# Patient Record
Sex: FEMALE | Race: WHITE | NOT HISPANIC OR LATINO | Employment: OTHER | URBAN - METROPOLITAN AREA
[De-identification: names, ages, dates, MRNs, and addresses within clinical notes are randomized per-mention and may not be internally consistent; named-entity substitution may affect disease eponyms.]

---

## 2017-03-03 ENCOUNTER — CLINIC PROCEDURE ONLY (OUTPATIENT)
Dept: URBAN - METROPOLITAN AREA CLINIC 33 | Facility: CLINIC | Age: 82
End: 2017-03-03

## 2017-03-03 DIAGNOSIS — H35.3231: ICD-10-CM

## 2017-03-03 PROCEDURE — 67028 INJECTION EYE DRUG: CPT

## 2017-03-03 PROCEDURE — J2778* LUCENTIS 0.1MG

## 2017-03-03 ASSESSMENT — VISUAL ACUITY
OD_SC: 20/400-1
OS_SC: 20/80-1

## 2017-03-03 ASSESSMENT — TONOMETRY
OD_IOP_MMHG: 13
OS_IOP_MMHG: 13

## 2017-05-05 ENCOUNTER — CLINIC PROCEDURE ONLY (OUTPATIENT)
Dept: URBAN - METROPOLITAN AREA CLINIC 33 | Facility: CLINIC | Age: 82
End: 2017-05-05

## 2017-05-05 DIAGNOSIS — H35.3231: ICD-10-CM

## 2017-05-05 PROCEDURE — 67028 INJECTION EYE DRUG: CPT

## 2017-05-05 ASSESSMENT — TONOMETRY
OD_IOP_MMHG: 13
OS_IOP_MMHG: 15

## 2017-05-05 ASSESSMENT — VISUAL ACUITY
OS_SC: 20/200
OD_SC: 20/400-1

## 2017-07-14 ENCOUNTER — FOLLOW UP AND POST INJECTION EVALUATION (OUTPATIENT)
Dept: URBAN - METROPOLITAN AREA CLINIC 33 | Facility: CLINIC | Age: 82
End: 2017-07-14

## 2017-07-14 VITALS
HEIGHT: 64 IN | HEART RATE: 76 BPM | DIASTOLIC BLOOD PRESSURE: 80 MMHG | SYSTOLIC BLOOD PRESSURE: 138 MMHG | WEIGHT: 205 LBS | BODY MASS INDEX: 35 KG/M2

## 2017-07-14 DIAGNOSIS — H35.3231: ICD-10-CM

## 2017-07-14 DIAGNOSIS — H01.003: ICD-10-CM

## 2017-07-14 DIAGNOSIS — H35.372: ICD-10-CM

## 2017-07-14 DIAGNOSIS — H43.813: ICD-10-CM

## 2017-07-14 DIAGNOSIS — H01.006: ICD-10-CM

## 2017-07-14 PROCEDURE — 92235 FLUORESCEIN ANGRPH MLTIFRAME: CPT

## 2017-07-14 PROCEDURE — 92014 COMPRE OPH EXAM EST PT 1/>: CPT

## 2017-07-14 PROCEDURE — 92250 FUNDUS PHOTOGRAPHY W/I&R: CPT

## 2017-07-14 PROCEDURE — 92134 CPTRZ OPH DX IMG PST SGM RTA: CPT

## 2017-07-14 ASSESSMENT — TONOMETRY
OD_IOP_MMHG: 15
OS_IOP_MMHG: 15

## 2017-07-14 ASSESSMENT — VISUAL ACUITY
OD_SC: 20/200
OD_PH: 20/200+2
OS_SC: 20/60-2
OS_PH: 20/50-

## 2017-07-21 ENCOUNTER — CLINIC PROCEDURE ONLY (OUTPATIENT)
Dept: URBAN - METROPOLITAN AREA CLINIC 33 | Facility: CLINIC | Age: 82
End: 2017-07-21

## 2017-07-21 DIAGNOSIS — H35.3231: ICD-10-CM

## 2017-07-21 PROCEDURE — J2778* LUCENTIS 0.1MG

## 2017-07-21 PROCEDURE — 67028 INJECTION EYE DRUG: CPT

## 2017-07-21 ASSESSMENT — TONOMETRY
OS_IOP_MMHG: 15
OD_IOP_MMHG: 11

## 2017-07-21 ASSESSMENT — VISUAL ACUITY
OD_SC: 20/200-1
OS_SC: 20/60-1

## 2017-09-22 ENCOUNTER — CLINIC PROCEDURE ONLY (OUTPATIENT)
Dept: URBAN - METROPOLITAN AREA CLINIC 33 | Facility: CLINIC | Age: 82
End: 2017-09-22

## 2017-09-22 DIAGNOSIS — H35.3231: ICD-10-CM

## 2017-09-22 PROCEDURE — 67028 INJECTION EYE DRUG: CPT

## 2017-09-22 PROCEDURE — J2778* LUCENTIS 0.1MG

## 2017-09-22 ASSESSMENT — VISUAL ACUITY
OS_CC: 20/60-
OD_CC: 20/200

## 2017-09-22 ASSESSMENT — TONOMETRY
OS_IOP_MMHG: 15
OD_IOP_MMHG: 14

## 2017-12-01 ENCOUNTER — FOLLOW UP (OUTPATIENT)
Dept: URBAN - METROPOLITAN AREA CLINIC 33 | Facility: CLINIC | Age: 82
End: 2017-12-01

## 2017-12-01 VITALS — HEART RATE: 76 BPM | HEIGHT: 55 IN | SYSTOLIC BLOOD PRESSURE: 131 MMHG | DIASTOLIC BLOOD PRESSURE: 90 MMHG

## 2017-12-01 DIAGNOSIS — H43.813: ICD-10-CM

## 2017-12-01 DIAGNOSIS — H35.372: ICD-10-CM

## 2017-12-01 DIAGNOSIS — H35.3231: ICD-10-CM

## 2017-12-01 PROCEDURE — 92134 CPTRZ OPH DX IMG PST SGM RTA: CPT

## 2017-12-01 PROCEDURE — 92250 FUNDUS PHOTOGRAPHY W/I&R: CPT

## 2017-12-01 PROCEDURE — 92014 COMPRE OPH EXAM EST PT 1/>: CPT

## 2017-12-01 ASSESSMENT — VISUAL ACUITY
OD_PH: 20/100-1
OD_CC: 20/200-3
OS_CC: 20/50+2

## 2017-12-01 ASSESSMENT — TONOMETRY
OD_IOP_MMHG: 11
OS_IOP_MMHG: 13

## 2017-12-08 ENCOUNTER — CLINIC PROCEDURE ONLY (OUTPATIENT)
Dept: URBAN - METROPOLITAN AREA CLINIC 33 | Facility: CLINIC | Age: 82
End: 2017-12-08

## 2017-12-08 DIAGNOSIS — H35.3231: ICD-10-CM

## 2017-12-08 PROCEDURE — J2778* LUCENTIS 0.1MG

## 2017-12-08 PROCEDURE — 67028 INJECTION EYE DRUG: CPT

## 2017-12-08 ASSESSMENT — VISUAL ACUITY
OS_SC: 20/100
OD_SC: 20/400
OS_PH: 20/50
OD_PH: 20/200+2

## 2017-12-08 ASSESSMENT — TONOMETRY
OS_IOP_MMHG: 13
OD_IOP_MMHG: 11

## 2018-02-09 ENCOUNTER — CLINIC PROCEDURE ONLY (OUTPATIENT)
Dept: URBAN - METROPOLITAN AREA CLINIC 33 | Facility: CLINIC | Age: 83
End: 2018-02-09

## 2018-02-09 DIAGNOSIS — H35.3231: ICD-10-CM

## 2018-02-09 PROCEDURE — 67028 INJECTION EYE DRUG: CPT

## 2018-02-09 PROCEDURE — J2778* LUCENTIS 0.1MG

## 2018-02-09 ASSESSMENT — TONOMETRY
OS_IOP_MMHG: 12
OD_IOP_MMHG: 12

## 2018-02-09 ASSESSMENT — VISUAL ACUITY
OD_CC: 20/200
OS_CC: 20/50-

## 2018-04-13 ENCOUNTER — CLINIC PROCEDURE ONLY (OUTPATIENT)
Dept: URBAN - METROPOLITAN AREA CLINIC 33 | Facility: CLINIC | Age: 83
End: 2018-04-13

## 2018-04-13 DIAGNOSIS — H35.3231: ICD-10-CM

## 2018-04-13 PROCEDURE — J2778* LUCENTIS 0.1MG

## 2018-04-13 PROCEDURE — 67028 INJECTION EYE DRUG: CPT

## 2018-04-13 ASSESSMENT — TONOMETRY
OS_IOP_MMHG: 12
OD_IOP_MMHG: 11

## 2018-04-13 ASSESSMENT — VISUAL ACUITY
OD_CC: 20/200-2
OS_CC: 20/60+2

## 2018-06-22 ENCOUNTER — FOLLOW UP AND POST INJECTION EVALUATION (OUTPATIENT)
Dept: URBAN - METROPOLITAN AREA CLINIC 33 | Facility: CLINIC | Age: 83
End: 2018-06-22

## 2018-06-22 VITALS — WEIGHT: 190 LBS | BODY MASS INDEX: 32.44 KG/M2 | HEIGHT: 64 IN

## 2018-06-22 DIAGNOSIS — H02.833: ICD-10-CM

## 2018-06-22 DIAGNOSIS — H43.813: ICD-10-CM

## 2018-06-22 DIAGNOSIS — H01.003: ICD-10-CM

## 2018-06-22 DIAGNOSIS — H35.3231: ICD-10-CM

## 2018-06-22 DIAGNOSIS — H04.123: ICD-10-CM

## 2018-06-22 DIAGNOSIS — H01.006: ICD-10-CM

## 2018-06-22 DIAGNOSIS — H02.836: ICD-10-CM

## 2018-06-22 DIAGNOSIS — H35.372: ICD-10-CM

## 2018-06-22 PROCEDURE — 67028 INJECTION EYE DRUG: CPT

## 2018-06-22 PROCEDURE — 92250 FUNDUS PHOTOGRAPHY W/I&R: CPT

## 2018-06-22 PROCEDURE — 92134 CPTRZ OPH DX IMG PST SGM RTA: CPT

## 2018-06-22 PROCEDURE — 92014 COMPRE OPH EXAM EST PT 1/>: CPT

## 2018-06-22 ASSESSMENT — VISUAL ACUITY
OD_PH: 20/400+1
OD_CC: 20/400+1
OS_CC: 20/50-1

## 2018-06-22 ASSESSMENT — TONOMETRY
OD_IOP_MMHG: 13
OS_IOP_MMHG: 13

## 2018-08-24 ENCOUNTER — CLINICAL PROCEDURE AND DIAGNOSTIC TESTING ONLY (OUTPATIENT)
Dept: URBAN - METROPOLITAN AREA CLINIC 33 | Facility: CLINIC | Age: 83
End: 2018-08-24

## 2018-08-24 DIAGNOSIS — H35.3231: ICD-10-CM

## 2018-08-24 PROCEDURE — 92134 CPTRZ OPH DX IMG PST SGM RTA: CPT

## 2018-08-24 PROCEDURE — 67028 INJECTION EYE DRUG: CPT

## 2018-08-24 ASSESSMENT — VISUAL ACUITY
OS_CC: 20/50-
OD_CC: 20/400

## 2018-08-24 ASSESSMENT — TONOMETRY
OS_IOP_MMHG: 13
OD_IOP_MMHG: 12

## 2018-11-02 ENCOUNTER — CLINICAL PROCEDURE AND DIAGNOSTIC TESTING ONLY (OUTPATIENT)
Dept: URBAN - METROPOLITAN AREA CLINIC 33 | Facility: CLINIC | Age: 83
End: 2018-11-02

## 2018-11-02 DIAGNOSIS — H35.3231: ICD-10-CM

## 2018-11-02 PROCEDURE — 67028 INJECTION EYE DRUG: CPT

## 2018-11-02 PROCEDURE — 92134 CPTRZ OPH DX IMG PST SGM RTA: CPT

## 2018-11-02 ASSESSMENT — TONOMETRY
OD_IOP_MMHG: 14
OS_IOP_MMHG: 17

## 2018-11-02 ASSESSMENT — VISUAL ACUITY
OD_CC: 20/400-1
OS_CC: 20/60-2
OS_PH: 20/60+1

## 2019-01-04 ENCOUNTER — FOLLOW UP AND POST INJECTION EVALUATION (OUTPATIENT)
Dept: URBAN - METROPOLITAN AREA CLINIC 33 | Facility: CLINIC | Age: 84
End: 2019-01-04

## 2019-01-04 VITALS — WEIGHT: 190 LBS | BODY MASS INDEX: 32.44 KG/M2 | HEIGHT: 64 IN

## 2019-01-04 DIAGNOSIS — H01.006: ICD-10-CM

## 2019-01-04 DIAGNOSIS — H02.833: ICD-10-CM

## 2019-01-04 DIAGNOSIS — H35.372: ICD-10-CM

## 2019-01-04 DIAGNOSIS — H04.123: ICD-10-CM

## 2019-01-04 DIAGNOSIS — H01.003: ICD-10-CM

## 2019-01-04 DIAGNOSIS — H43.813: ICD-10-CM

## 2019-01-04 DIAGNOSIS — H02.836: ICD-10-CM

## 2019-01-04 DIAGNOSIS — H35.3231: ICD-10-CM

## 2019-01-04 PROCEDURE — 92014 COMPRE OPH EXAM EST PT 1/>: CPT

## 2019-01-04 PROCEDURE — 92134 CPTRZ OPH DX IMG PST SGM RTA: CPT

## 2019-01-04 PROCEDURE — 92250 FUNDUS PHOTOGRAPHY W/I&R: CPT

## 2019-01-04 ASSESSMENT — VISUAL ACUITY
OD_CC: 20/200+1
OS_CC: 20/50

## 2019-01-04 ASSESSMENT — TONOMETRY
OS_IOP_MMHG: 16
OD_IOP_MMHG: 14

## 2019-01-11 ENCOUNTER — CLINIC PROCEDURE ONLY (OUTPATIENT)
Dept: URBAN - METROPOLITAN AREA CLINIC 33 | Facility: CLINIC | Age: 84
End: 2019-01-11

## 2019-01-11 DIAGNOSIS — H35.3231: ICD-10-CM

## 2019-01-11 PROCEDURE — 67028 INJECTION EYE DRUG: CPT

## 2019-01-11 ASSESSMENT — TONOMETRY
OD_IOP_MMHG: 13
OS_IOP_MMHG: 12

## 2019-01-11 ASSESSMENT — VISUAL ACUITY
OS_CC: 20/50-1
OD_CC: 20/200-2

## 2019-03-15 ENCOUNTER — CLINICAL PROCEDURE AND DIAGNOSTIC TESTING ONLY (OUTPATIENT)
Dept: URBAN - METROPOLITAN AREA CLINIC 33 | Facility: CLINIC | Age: 84
End: 2019-03-15

## 2019-03-15 DIAGNOSIS — H35.3231: ICD-10-CM

## 2019-03-15 PROCEDURE — 67028 INJECTION EYE DRUG: CPT

## 2019-03-15 PROCEDURE — 92134 CPTRZ OPH DX IMG PST SGM RTA: CPT

## 2019-03-15 PROCEDURE — J2778* LUCENTIS 0.1MG

## 2019-03-15 ASSESSMENT — TONOMETRY
OD_IOP_MMHG: 14
OS_IOP_MMHG: 16

## 2019-03-15 ASSESSMENT — VISUAL ACUITY
OS_SC: 20/50
OD_SC: 20/200

## 2019-05-17 ENCOUNTER — CLINICAL PROCEDURE AND DIAGNOSTIC TESTING ONLY (OUTPATIENT)
Dept: URBAN - METROPOLITAN AREA CLINIC 33 | Facility: CLINIC | Age: 84
End: 2019-05-17

## 2019-05-17 DIAGNOSIS — H35.3231: ICD-10-CM

## 2019-05-17 PROCEDURE — 67028 INJECTION EYE DRUG: CPT

## 2019-05-17 PROCEDURE — J2778* LUCENTIS 0.1MG

## 2019-05-17 PROCEDURE — 92134 CPTRZ OPH DX IMG PST SGM RTA: CPT

## 2019-05-17 ASSESSMENT — VISUAL ACUITY
OS_CC: 20/60+1
OD_CC: 20/200-2

## 2019-05-17 ASSESSMENT — TONOMETRY
OS_IOP_MMHG: 14
OD_IOP_MMHG: 16

## 2019-07-12 ENCOUNTER — FOLLOW UP (OUTPATIENT)
Dept: URBAN - METROPOLITAN AREA CLINIC 33 | Facility: CLINIC | Age: 84
End: 2019-07-12

## 2019-07-12 DIAGNOSIS — H04.123: ICD-10-CM

## 2019-07-12 DIAGNOSIS — H35.372: ICD-10-CM

## 2019-07-12 DIAGNOSIS — H02.836: ICD-10-CM

## 2019-07-12 DIAGNOSIS — H35.3231: ICD-10-CM

## 2019-07-12 DIAGNOSIS — H43.813: ICD-10-CM

## 2019-07-12 DIAGNOSIS — H01.006: ICD-10-CM

## 2019-07-12 DIAGNOSIS — H01.003: ICD-10-CM

## 2019-07-12 DIAGNOSIS — H02.833: ICD-10-CM

## 2019-07-12 PROCEDURE — 92250 FUNDUS PHOTOGRAPHY W/I&R: CPT

## 2019-07-12 PROCEDURE — 92134 CPTRZ OPH DX IMG PST SGM RTA: CPT

## 2019-07-12 PROCEDURE — 92014 COMPRE OPH EXAM EST PT 1/>: CPT

## 2019-07-12 ASSESSMENT — TONOMETRY
OS_IOP_MMHG: 10
OD_IOP_MMHG: 13

## 2019-07-12 ASSESSMENT — VISUAL ACUITY
OS_CC: 20/50+2
OD_CC: 20/200

## 2019-07-19 ENCOUNTER — CLINIC PROCEDURE ONLY (OUTPATIENT)
Dept: URBAN - METROPOLITAN AREA CLINIC 33 | Facility: CLINIC | Age: 84
End: 2019-07-19

## 2019-07-19 DIAGNOSIS — H35.3231: ICD-10-CM

## 2019-07-19 PROCEDURE — J2778* LUCENTIS 0.1MG

## 2019-07-19 PROCEDURE — 67028 INJECTION EYE DRUG: CPT

## 2019-07-19 ASSESSMENT — TONOMETRY
OS_IOP_MMHG: 19
OD_IOP_MMHG: 14

## 2019-07-19 ASSESSMENT — VISUAL ACUITY
OS_CC: 20/60-2
OD_CC: 20/200-2

## 2019-09-13 ENCOUNTER — CLINICAL PROCEDURE AND DIAGNOSTIC TESTING ONLY (OUTPATIENT)
Dept: URBAN - METROPOLITAN AREA CLINIC 33 | Facility: CLINIC | Age: 84
End: 2019-09-13

## 2019-09-13 DIAGNOSIS — H35.3231: ICD-10-CM

## 2019-09-13 PROCEDURE — 67028 INJECTION EYE DRUG: CPT

## 2019-09-13 PROCEDURE — J2778* LUCENTIS 0.1MG

## 2019-09-13 PROCEDURE — 92134 CPTRZ OPH DX IMG PST SGM RTA: CPT

## 2019-09-13 ASSESSMENT — VISUAL ACUITY
OD_CC: 20/400
OS_CC: 20/40-

## 2019-09-13 ASSESSMENT — TONOMETRY
OS_IOP_MMHG: 14
OD_IOP_MMHG: 14

## 2019-11-08 ENCOUNTER — CLINICAL PROCEDURE AND DIAGNOSTIC TESTING ONLY (OUTPATIENT)
Dept: URBAN - METROPOLITAN AREA CLINIC 33 | Facility: CLINIC | Age: 84
End: 2019-11-08

## 2019-11-08 DIAGNOSIS — H35.3231: ICD-10-CM

## 2019-11-08 PROCEDURE — 92134 CPTRZ OPH DX IMG PST SGM RTA: CPT

## 2019-11-08 PROCEDURE — 67028 INJECTION EYE DRUG: CPT

## 2019-11-08 PROCEDURE — J2778* LUCENTIS 0.1MG

## 2019-11-08 ASSESSMENT — TONOMETRY
OS_IOP_MMHG: 14
OD_IOP_MMHG: 13

## 2019-11-08 ASSESSMENT — VISUAL ACUITY
OD_CC: 20/200+2
OS_CC: 20/40

## 2020-01-03 ENCOUNTER — FOLLOW UP AND POST INJECTION EVALUATION (OUTPATIENT)
Dept: URBAN - METROPOLITAN AREA CLINIC 33 | Facility: CLINIC | Age: 85
End: 2020-01-03

## 2020-01-03 VITALS — BODY MASS INDEX: 34.15 KG/M2 | WEIGHT: 200 LBS | HEIGHT: 64 IN

## 2020-01-03 DIAGNOSIS — H35.372: ICD-10-CM

## 2020-01-03 DIAGNOSIS — H02.836: ICD-10-CM

## 2020-01-03 DIAGNOSIS — H35.3231: ICD-10-CM

## 2020-01-03 DIAGNOSIS — H01.006: ICD-10-CM

## 2020-01-03 DIAGNOSIS — H02.833: ICD-10-CM

## 2020-01-03 DIAGNOSIS — H04.123: ICD-10-CM

## 2020-01-03 DIAGNOSIS — H43.813: ICD-10-CM

## 2020-01-03 DIAGNOSIS — H01.003: ICD-10-CM

## 2020-01-03 PROCEDURE — 92250 FUNDUS PHOTOGRAPHY W/I&R: CPT

## 2020-01-03 PROCEDURE — 92014 COMPRE OPH EXAM EST PT 1/>: CPT

## 2020-01-03 PROCEDURE — 92134 CPTRZ OPH DX IMG PST SGM RTA: CPT

## 2020-01-03 ASSESSMENT — TONOMETRY
OD_IOP_MMHG: 14
OS_IOP_MMHG: 17

## 2020-01-03 ASSESSMENT — VISUAL ACUITY
OS_CC: 20/40
OD_CC: 20/400+1

## 2020-01-10 ENCOUNTER — CLINIC PROCEDURE ONLY (OUTPATIENT)
Dept: URBAN - METROPOLITAN AREA CLINIC 33 | Facility: CLINIC | Age: 85
End: 2020-01-10

## 2020-01-10 DIAGNOSIS — H35.3231: ICD-10-CM

## 2020-01-10 PROCEDURE — J2778* LUCENTIS 0.1MG

## 2020-01-10 PROCEDURE — 67028 INJECTION EYE DRUG: CPT

## 2020-01-10 ASSESSMENT — TONOMETRY
OS_IOP_MMHG: 14
OD_IOP_MMHG: 15

## 2020-01-10 ASSESSMENT — VISUAL ACUITY
OS_CC: 20/40-2
OD_CC: 20/200+2

## 2020-01-17 ENCOUNTER — APPOINTMENT (RX ONLY)
Dept: URBAN - METROPOLITAN AREA CLINIC 116 | Facility: CLINIC | Age: 85
Setting detail: DERMATOLOGY
End: 2020-01-17

## 2020-01-17 DIAGNOSIS — D18.0 HEMANGIOMA: ICD-10-CM

## 2020-01-17 DIAGNOSIS — L81.4 OTHER MELANIN HYPERPIGMENTATION: ICD-10-CM

## 2020-01-17 DIAGNOSIS — Z85.828 PERSONAL HISTORY OF OTHER MALIGNANT NEOPLASM OF SKIN: ICD-10-CM

## 2020-01-17 DIAGNOSIS — L82.1 OTHER SEBORRHEIC KERATOSIS: ICD-10-CM

## 2020-01-17 DIAGNOSIS — L57.0 ACTINIC KERATOSIS: ICD-10-CM

## 2020-01-17 PROBLEM — H91.90 UNSPECIFIED HEARING LOSS, UNSPECIFIED EAR: Status: ACTIVE | Noted: 2020-01-17

## 2020-01-17 PROBLEM — F41.9 ANXIETY DISORDER, UNSPECIFIED: Status: ACTIVE | Noted: 2020-01-17

## 2020-01-17 PROBLEM — D18.01 HEMANGIOMA OF SKIN AND SUBCUTANEOUS TISSUE: Status: ACTIVE | Noted: 2020-01-17

## 2020-01-17 PROBLEM — F32.9 MAJOR DEPRESSIVE DISORDER, SINGLE EPISODE, UNSPECIFIED: Status: ACTIVE | Noted: 2020-01-17

## 2020-01-17 PROCEDURE — ? COUNSELING

## 2020-01-17 PROCEDURE — 17000 DESTRUCT PREMALG LESION: CPT

## 2020-01-17 PROCEDURE — ? LIQUID NITROGEN

## 2020-01-17 PROCEDURE — ? OBSERVATION

## 2020-01-17 PROCEDURE — ? ADDITIONAL NOTES

## 2020-01-17 PROCEDURE — 17003 DESTRUCT PREMALG LES 2-14: CPT

## 2020-01-17 PROCEDURE — 99203 OFFICE O/P NEW LOW 30 MIN: CPT | Mod: 25

## 2020-01-17 ASSESSMENT — LOCATION SIMPLE DESCRIPTION DERM
LOCATION SIMPLE: UPPER BACK
LOCATION SIMPLE: RIGHT ANTERIOR NECK
LOCATION SIMPLE: LEFT NOSE
LOCATION SIMPLE: LEFT ANTERIOR NECK
LOCATION SIMPLE: NOSE
LOCATION SIMPLE: LEFT FOREHEAD
LOCATION SIMPLE: RIGHT UPPER BACK
LOCATION SIMPLE: RIGHT THIGH
LOCATION SIMPLE: LEFT LIP
LOCATION SIMPLE: LEFT CHEEK

## 2020-01-17 ASSESSMENT — LOCATION DETAILED DESCRIPTION DERM
LOCATION DETAILED: NASAL DORSUM
LOCATION DETAILED: LEFT UPPER CUTANEOUS LIP
LOCATION DETAILED: LEFT FOREHEAD
LOCATION DETAILED: LEFT SUPERIOR CENTRAL BUCCAL CHEEK
LOCATION DETAILED: RIGHT MEDIAL UPPER BACK
LOCATION DETAILED: LEFT NASAL ALA
LOCATION DETAILED: SUPERIOR THORACIC SPINE
LOCATION DETAILED: LEFT SUPERIOR ANTERIOR NECK
LOCATION DETAILED: RIGHT SUPERIOR ANTERIOR NECK
LOCATION DETAILED: RIGHT ANTERIOR DISTAL THIGH

## 2020-01-17 ASSESSMENT — LOCATION ZONE DERM
LOCATION ZONE: NECK
LOCATION ZONE: FACE
LOCATION ZONE: TRUNK
LOCATION ZONE: NOSE
LOCATION ZONE: LIP
LOCATION ZONE: LEG

## 2020-01-17 NOTE — PROCEDURE: LIQUID NITROGEN
Communicable/Infectious
Duration Of Freeze Thaw-Cycle (Seconds): 3
Render Note In Bullet Format When Appropriate: No
Post-Care Instructions: I reviewed with the patient in detail post-care instructions. Patient is to wear sunprotection, and avoid picking at any of the treated lesions. Pt may apply Vaseline to crusted or scabbing areas.
Number Of Freeze-Thaw Cycles: 3 freeze-thaw cycles
Render Post-Care Instructions In Note?: yes
Detail Level: Detailed
Consent: The patient's consent was obtained including but not limited to risks of crusting, scabbing, blistering, scarring, darker or lighter pigmentary change, recurrence, incomplete removal and infection.

## 2020-03-06 ENCOUNTER — CLINICAL PROCEDURE AND DIAGNOSTIC TESTING ONLY (OUTPATIENT)
Dept: URBAN - METROPOLITAN AREA CLINIC 33 | Facility: CLINIC | Age: 85
End: 2020-03-06

## 2020-03-06 DIAGNOSIS — H35.3231: ICD-10-CM

## 2020-03-06 PROCEDURE — 92250 FUNDUS PHOTOGRAPHY W/I&R: CPT

## 2020-03-06 PROCEDURE — 92134 CPTRZ OPH DX IMG PST SGM RTA: CPT

## 2020-03-06 PROCEDURE — 67028 INJECTION EYE DRUG: CPT

## 2020-03-06 PROCEDURE — J2778* LUCENTIS 0.1MG

## 2020-03-06 ASSESSMENT — VISUAL ACUITY
OS_CC: 20/60-
OD_CC: 20/200

## 2020-03-06 ASSESSMENT — TONOMETRY
OD_IOP_MMHG: 12
OS_IOP_MMHG: 12

## 2020-05-01 ENCOUNTER — FOLLOW UP AND POST INJECTION EVALUATION (OUTPATIENT)
Dept: URBAN - METROPOLITAN AREA CLINIC 33 | Facility: CLINIC | Age: 85
End: 2020-05-01

## 2020-05-01 DIAGNOSIS — H35.3231: ICD-10-CM

## 2020-05-01 DIAGNOSIS — H35.372: ICD-10-CM

## 2020-05-01 PROCEDURE — J2778* LUCENTIS 0.1MG

## 2020-05-01 PROCEDURE — 92250 FUNDUS PHOTOGRAPHY W/I&R: CPT

## 2020-05-01 PROCEDURE — 92134 CPTRZ OPH DX IMG PST SGM RTA: CPT

## 2020-05-01 PROCEDURE — 67028 INJECTION EYE DRUG: CPT

## 2020-05-01 ASSESSMENT — VISUAL ACUITY
OS_CC: 20/50-1
OD_CC: 20/200-2

## 2020-05-01 ASSESSMENT — TONOMETRY
OS_IOP_MMHG: 15
OD_IOP_MMHG: 13

## 2020-06-12 ENCOUNTER — CLINICAL PROCEDURE AND DIAGNOSTIC TESTING ONLY (OUTPATIENT)
Dept: URBAN - METROPOLITAN AREA CLINIC 33 | Facility: CLINIC | Age: 85
End: 2020-06-12

## 2020-06-12 DIAGNOSIS — H35.3231: ICD-10-CM

## 2020-06-12 DIAGNOSIS — H35.372: ICD-10-CM

## 2020-06-12 PROCEDURE — 67028 INJECTION EYE DRUG: CPT

## 2020-06-12 PROCEDURE — 92134 CPTRZ OPH DX IMG PST SGM RTA: CPT

## 2020-06-12 PROCEDURE — J2778* LUCENTIS 0.1MG

## 2020-06-12 PROCEDURE — 92250 FUNDUS PHOTOGRAPHY W/I&R: CPT

## 2020-06-12 ASSESSMENT — TONOMETRY
OD_IOP_MMHG: 13
OS_IOP_MMHG: 11

## 2020-06-12 ASSESSMENT — VISUAL ACUITY
OS_CC: 20/60-1
OD_CC: 20/200-2

## 2020-08-07 ENCOUNTER — FOLLOW UP AND POST INJECTION EVALUATION (OUTPATIENT)
Dept: URBAN - METROPOLITAN AREA CLINIC 33 | Facility: CLINIC | Age: 85
End: 2020-08-07

## 2020-08-07 DIAGNOSIS — H01.003: ICD-10-CM

## 2020-08-07 DIAGNOSIS — H43.813: ICD-10-CM

## 2020-08-07 DIAGNOSIS — H35.372: ICD-10-CM

## 2020-08-07 DIAGNOSIS — H02.836: ICD-10-CM

## 2020-08-07 DIAGNOSIS — H01.006: ICD-10-CM

## 2020-08-07 DIAGNOSIS — H35.3231: ICD-10-CM

## 2020-08-07 DIAGNOSIS — H02.833: ICD-10-CM

## 2020-08-07 DIAGNOSIS — H04.123: ICD-10-CM

## 2020-08-07 PROCEDURE — 92134 CPTRZ OPH DX IMG PST SGM RTA: CPT

## 2020-08-07 PROCEDURE — 67028 INJECTION EYE DRUG: CPT

## 2020-08-07 PROCEDURE — J2778* LUCENTIS 0.1MG

## 2020-08-07 PROCEDURE — 92250 FUNDUS PHOTOGRAPHY W/I&R: CPT

## 2020-08-07 PROCEDURE — 92014 COMPRE OPH EXAM EST PT 1/>: CPT

## 2020-08-07 ASSESSMENT — TONOMETRY
OS_IOP_MMHG: 15
OD_IOP_MMHG: 13

## 2020-08-07 ASSESSMENT — VISUAL ACUITY
OD_PH: 20/200-2
OD_CC: 20/400-1
OS_CC: 20/80-2
OS_PH: 20/50-1

## 2020-09-18 ENCOUNTER — CLINICAL PROCEDURE AND DIAGNOSTIC TESTING ONLY (OUTPATIENT)
Dept: URBAN - METROPOLITAN AREA CLINIC 33 | Facility: CLINIC | Age: 85
End: 2020-09-18

## 2020-09-18 DIAGNOSIS — H35.3231: ICD-10-CM

## 2020-09-18 PROCEDURE — 67028 INJECTION EYE DRUG: CPT

## 2020-09-18 PROCEDURE — 92134 CPTRZ OPH DX IMG PST SGM RTA: CPT

## 2020-09-18 PROCEDURE — J2778* LUCENTIS 0.1MG

## 2020-09-18 ASSESSMENT — TONOMETRY
OD_IOP_MMHG: 14
OS_IOP_MMHG: 15

## 2020-09-18 ASSESSMENT — VISUAL ACUITY
OS_CC: 20/70
OD_CC: 20/200-2

## 2020-10-30 ENCOUNTER — CLINICAL PROCEDURE AND DIAGNOSTIC TESTING ONLY (OUTPATIENT)
Dept: URBAN - METROPOLITAN AREA CLINIC 33 | Facility: CLINIC | Age: 85
End: 2020-10-30

## 2020-10-30 DIAGNOSIS — H35.3231: ICD-10-CM

## 2020-10-30 PROCEDURE — 67028 INJECTION EYE DRUG: CPT

## 2020-10-30 PROCEDURE — J2778* LUCENTIS 0.1MG

## 2020-10-30 PROCEDURE — 92134 CPTRZ OPH DX IMG PST SGM RTA: CPT

## 2020-10-30 PROCEDURE — 92250 FUNDUS PHOTOGRAPHY W/I&R: CPT

## 2020-10-30 ASSESSMENT — VISUAL ACUITY
OD_CC: 20/200
OS_CC: 20/70-1

## 2020-10-30 ASSESSMENT — TONOMETRY
OD_IOP_MMHG: 12
OS_IOP_MMHG: 14

## 2020-12-11 ENCOUNTER — CLINICAL PROCEDURE AND DIAGNOSTIC TESTING ONLY (OUTPATIENT)
Dept: URBAN - METROPOLITAN AREA CLINIC 33 | Facility: CLINIC | Age: 85
End: 2020-12-11

## 2020-12-11 DIAGNOSIS — H35.3231: ICD-10-CM

## 2020-12-11 PROCEDURE — 67028 INJECTION EYE DRUG: CPT

## 2020-12-11 PROCEDURE — 92250 FUNDUS PHOTOGRAPHY W/I&R: CPT

## 2020-12-11 PROCEDURE — 92134 CPTRZ OPH DX IMG PST SGM RTA: CPT

## 2020-12-11 PROCEDURE — J2778* LUCENTIS 0.1MG

## 2020-12-11 ASSESSMENT — VISUAL ACUITY
OS_CC: 20/60-2
OD_CC: 20/200-2

## 2020-12-11 ASSESSMENT — TONOMETRY
OD_IOP_MMHG: 12
OS_IOP_MMHG: 14

## 2021-01-22 ENCOUNTER — FOLLOW UP AND POST INJECTION EVALUATION (OUTPATIENT)
Dept: URBAN - METROPOLITAN AREA CLINIC 33 | Facility: CLINIC | Age: 86
End: 2021-01-22

## 2021-01-22 DIAGNOSIS — H35.3231: ICD-10-CM

## 2021-01-22 DIAGNOSIS — H43.813: ICD-10-CM

## 2021-01-22 DIAGNOSIS — H35.372: ICD-10-CM

## 2021-01-22 PROCEDURE — 92134 CPTRZ OPH DX IMG PST SGM RTA: CPT

## 2021-01-22 PROCEDURE — 92014 COMPRE OPH EXAM EST PT 1/>: CPT

## 2021-01-22 PROCEDURE — 67028 INJECTION EYE DRUG: CPT

## 2021-01-22 PROCEDURE — 92250 FUNDUS PHOTOGRAPHY W/I&R: CPT

## 2021-01-22 PROCEDURE — J2778* LUCENTIS 0.1MG

## 2021-01-22 ASSESSMENT — VISUAL ACUITY
OD_CC: 20/200+1
OS_CC: 20/70-2
OS_PH: 20/60

## 2021-01-22 ASSESSMENT — TONOMETRY
OD_IOP_MMHG: 12
OS_IOP_MMHG: 14

## 2021-03-05 ENCOUNTER — CLINICAL PROCEDURE AND DIAGNOSTIC TESTING ONLY (OUTPATIENT)
Dept: URBAN - METROPOLITAN AREA CLINIC 33 | Facility: CLINIC | Age: 86
End: 2021-03-05

## 2021-03-05 DIAGNOSIS — H35.3231: ICD-10-CM

## 2021-03-05 PROCEDURE — J2778* LUCENTIS 0.1MG

## 2021-03-05 PROCEDURE — 92250 FUNDUS PHOTOGRAPHY W/I&R: CPT

## 2021-03-05 PROCEDURE — 67028 INJECTION EYE DRUG: CPT

## 2021-03-05 ASSESSMENT — VISUAL ACUITY
OS_SC: 20/200-1
OD_SC: 20/200-2

## 2021-03-05 ASSESSMENT — TONOMETRY
OD_IOP_MMHG: 15
OS_IOP_MMHG: 17

## 2021-04-16 ENCOUNTER — CLINICAL PROCEDURE AND DIAGNOSTIC TESTING ONLY (OUTPATIENT)
Dept: URBAN - METROPOLITAN AREA CLINIC 33 | Facility: CLINIC | Age: 86
End: 2021-04-16

## 2021-04-16 DIAGNOSIS — H35.3231: ICD-10-CM

## 2021-04-16 PROCEDURE — 92134 CPTRZ OPH DX IMG PST SGM RTA: CPT

## 2021-04-16 PROCEDURE — 67028 INJECTION EYE DRUG: CPT

## 2021-04-16 PROCEDURE — 92250 FUNDUS PHOTOGRAPHY W/I&R: CPT

## 2021-04-16 ASSESSMENT — TONOMETRY
OD_IOP_MMHG: 15
OS_IOP_MMHG: 15

## 2021-04-16 ASSESSMENT — VISUAL ACUITY
OS_SC: 20/200-2
OD_SC: 20/400+1

## 2021-05-28 ENCOUNTER — FOLLOW UP AND POST INJECTION EVALUATION (OUTPATIENT)
Dept: URBAN - METROPOLITAN AREA CLINIC 33 | Facility: CLINIC | Age: 86
End: 2021-05-28

## 2021-05-28 VITALS — BODY MASS INDEX: 27.31 KG/M2 | HEIGHT: 64 IN | WEIGHT: 160 LBS

## 2021-05-28 DIAGNOSIS — H02.836: ICD-10-CM

## 2021-05-28 DIAGNOSIS — H53.8: ICD-10-CM

## 2021-05-28 DIAGNOSIS — H01.006: ICD-10-CM

## 2021-05-28 DIAGNOSIS — H35.3231: ICD-10-CM

## 2021-05-28 DIAGNOSIS — H35.372: ICD-10-CM

## 2021-05-28 DIAGNOSIS — H43.813: ICD-10-CM

## 2021-05-28 DIAGNOSIS — H01.003: ICD-10-CM

## 2021-05-28 DIAGNOSIS — H02.833: ICD-10-CM

## 2021-05-28 DIAGNOSIS — H04.123: ICD-10-CM

## 2021-05-28 PROCEDURE — 92134 CPTRZ OPH DX IMG PST SGM RTA: CPT

## 2021-05-28 PROCEDURE — 92012 INTRM OPH EXAM EST PATIENT: CPT

## 2021-05-28 PROCEDURE — 92250 FUNDUS PHOTOGRAPHY W/I&R: CPT

## 2021-05-28 PROCEDURE — 67028 INJECTION EYE DRUG: CPT

## 2021-05-28 ASSESSMENT — TONOMETRY
OD_IOP_MMHG: 12
OS_IOP_MMHG: 13

## 2021-05-28 ASSESSMENT — VISUAL ACUITY
OD_SC: 20/400-2
OS_SC: 20/200+2

## 2021-07-16 ENCOUNTER — CLINICAL PROCEDURE AND DIAGNOSTIC TESTING ONLY (OUTPATIENT)
Dept: URBAN - METROPOLITAN AREA CLINIC 33 | Facility: CLINIC | Age: 86
End: 2021-07-16

## 2021-07-16 DIAGNOSIS — H35.3231: ICD-10-CM

## 2021-07-16 DIAGNOSIS — H35.372: ICD-10-CM

## 2021-07-16 PROCEDURE — J2778* LUCENTIS 0.1MG

## 2021-07-16 PROCEDURE — 92134 CPTRZ OPH DX IMG PST SGM RTA: CPT

## 2021-07-16 PROCEDURE — 67028 INJECTION EYE DRUG: CPT

## 2021-07-16 PROCEDURE — 92250 FUNDUS PHOTOGRAPHY W/I&R: CPT

## 2021-07-16 ASSESSMENT — VISUAL ACUITY
OS_CC: 20/200
OS_PH: 20/80-2
OD_CC: 20/400-2

## 2021-07-16 ASSESSMENT — TONOMETRY
OD_IOP_MMHG: 12
OS_IOP_MMHG: 14

## 2021-08-27 ENCOUNTER — CLINICAL PROCEDURE AND DIAGNOSTIC TESTING ONLY (OUTPATIENT)
Dept: URBAN - METROPOLITAN AREA CLINIC 33 | Facility: CLINIC | Age: 86
End: 2021-08-27

## 2021-08-27 DIAGNOSIS — H35.3231: ICD-10-CM

## 2021-08-27 DIAGNOSIS — H35.372: ICD-10-CM

## 2021-08-27 PROCEDURE — 67028 INJECTION EYE DRUG: CPT

## 2021-08-27 PROCEDURE — 92250 FUNDUS PHOTOGRAPHY W/I&R: CPT

## 2021-08-27 PROCEDURE — 92134 CPTRZ OPH DX IMG PST SGM RTA: CPT

## 2021-08-27 PROCEDURE — J2778* LUCENTIS 0.1MG

## 2021-08-27 ASSESSMENT — VISUAL ACUITY
OD_SC: 20/400+1
OS_SC: 20/200+2

## 2021-08-27 ASSESSMENT — TONOMETRY
OS_IOP_MMHG: 14
OD_IOP_MMHG: 13

## 2021-10-08 ENCOUNTER — CLINICAL PROCEDURE AND DIAGNOSTIC TESTING ONLY (OUTPATIENT)
Dept: URBAN - METROPOLITAN AREA CLINIC 33 | Facility: CLINIC | Age: 86
End: 2021-10-08

## 2021-10-08 DIAGNOSIS — H35.3221: ICD-10-CM

## 2021-10-08 DIAGNOSIS — H35.372: ICD-10-CM

## 2021-10-08 DIAGNOSIS — H35.3211: ICD-10-CM

## 2021-10-08 PROCEDURE — 92250 FUNDUS PHOTOGRAPHY W/I&R: CPT

## 2021-10-08 PROCEDURE — 92134 CPTRZ OPH DX IMG PST SGM RTA: CPT

## 2021-10-08 PROCEDURE — J277850 RANIBIZUMAB (LUCENTIS) BILATERAL

## 2021-10-08 PROCEDURE — 67028 INJECTION EYE DRUG: CPT

## 2021-10-08 ASSESSMENT — VISUAL ACUITY
OD_SC: 20/400-1
OS_SC: 20/200+2

## 2021-10-08 ASSESSMENT — TONOMETRY
OS_IOP_MMHG: 14
OD_IOP_MMHG: 12

## 2021-11-19 ENCOUNTER — FOLLOW UP (OUTPATIENT)
Dept: URBAN - METROPOLITAN AREA CLINIC 33 | Facility: CLINIC | Age: 86
End: 2021-11-19

## 2021-11-19 VITALS — BODY MASS INDEX: 28.17 KG/M2 | HEIGHT: 64 IN | WEIGHT: 165 LBS

## 2021-11-19 DIAGNOSIS — H04.123: ICD-10-CM

## 2021-11-19 DIAGNOSIS — H35.372: ICD-10-CM

## 2021-11-19 DIAGNOSIS — H43.813: ICD-10-CM

## 2021-11-19 DIAGNOSIS — H35.3221: ICD-10-CM

## 2021-11-19 DIAGNOSIS — H53.8: ICD-10-CM

## 2021-11-19 DIAGNOSIS — H35.3211: ICD-10-CM

## 2021-11-19 PROCEDURE — 92014 COMPRE OPH EXAM EST PT 1/>: CPT

## 2021-11-19 PROCEDURE — 67028 INJECTION EYE DRUG: CPT

## 2021-11-19 PROCEDURE — J277850 RANIBIZUMAB (LUCENTIS) BILATERAL

## 2021-11-19 PROCEDURE — 92250 FUNDUS PHOTOGRAPHY W/I&R: CPT

## 2021-11-19 PROCEDURE — 92134 CPTRZ OPH DX IMG PST SGM RTA: CPT

## 2021-11-19 ASSESSMENT — TONOMETRY
OS_IOP_MMHG: 12
OD_IOP_MMHG: 11

## 2021-11-19 ASSESSMENT — VISUAL ACUITY
OD_SC: 20/200-1
OS_SC: 20/200+1

## 2021-12-31 ENCOUNTER — CLINIC PROCEDURE ONLY (OUTPATIENT)
Dept: URBAN - METROPOLITAN AREA CLINIC 33 | Facility: CLINIC | Age: 86
End: 2021-12-31

## 2021-12-31 DIAGNOSIS — H35.3211: ICD-10-CM

## 2021-12-31 DIAGNOSIS — H35.372: ICD-10-CM

## 2021-12-31 DIAGNOSIS — H35.3221: ICD-10-CM

## 2021-12-31 PROCEDURE — 92250 FUNDUS PHOTOGRAPHY W/I&R: CPT

## 2021-12-31 PROCEDURE — 67028 INJECTION EYE DRUG: CPT

## 2021-12-31 PROCEDURE — 92134 CPTRZ OPH DX IMG PST SGM RTA: CPT

## 2021-12-31 PROCEDURE — J277850 RANIBIZUMAB (LUCENTIS) BILATERAL

## 2021-12-31 ASSESSMENT — VISUAL ACUITY
OD_SC: 20/400-1
OS_SC: 20/200+2

## 2021-12-31 ASSESSMENT — TONOMETRY
OD_IOP_MMHG: 15
OS_IOP_MMHG: 14

## 2022-02-10 ENCOUNTER — CLINIC PROCEDURE ONLY (OUTPATIENT)
Dept: URBAN - METROPOLITAN AREA CLINIC 33 | Facility: CLINIC | Age: 87
End: 2022-02-10

## 2022-02-10 DIAGNOSIS — H35.3211: ICD-10-CM

## 2022-02-10 DIAGNOSIS — H35.3221: ICD-10-CM

## 2022-02-10 DIAGNOSIS — H35.372: ICD-10-CM

## 2022-02-10 PROCEDURE — 92250 FUNDUS PHOTOGRAPHY W/I&R: CPT

## 2022-02-10 PROCEDURE — 92134 CPTRZ OPH DX IMG PST SGM RTA: CPT

## 2022-02-10 PROCEDURE — 67028 INJECTION EYE DRUG: CPT

## 2022-02-10 ASSESSMENT — TONOMETRY
OD_IOP_MMHG: 14
OS_IOP_MMHG: 14

## 2022-03-24 ENCOUNTER — CLINIC PROCEDURE ONLY (OUTPATIENT)
Dept: URBAN - METROPOLITAN AREA CLINIC 33 | Facility: CLINIC | Age: 87
End: 2022-03-24

## 2022-03-24 DIAGNOSIS — H35.372: ICD-10-CM

## 2022-03-24 DIAGNOSIS — H35.3231: ICD-10-CM

## 2022-03-24 PROCEDURE — 92134 CPTRZ OPH DX IMG PST SGM RTA: CPT

## 2022-03-24 PROCEDURE — 92250 FUNDUS PHOTOGRAPHY W/I&R: CPT

## 2022-03-24 PROCEDURE — 67028 INJECTION EYE DRUG: CPT

## 2022-03-24 PROCEDURE — J277850 RANIBIZUMAB (LUCENTIS) BILATERAL

## 2022-03-24 ASSESSMENT — TONOMETRY
OD_IOP_MMHG: 13
OS_IOP_MMHG: 14

## 2022-03-30 NOTE — PATIENT DISCUSSION
Diabetes without Retinopathy Counseling :  I have discussed with the patient the importance of controlling blood glucose, blood pressure and lipid levels levels  to minimize the risk of developing retinal disease from diabetes. Explained the importance of annual dilated eye exams because effective treatment for diabetic retinopathy depends on timely intervention. The patient was instructed to call or return sooner if they noticed blurred or distorted vision, fluctuating vision, pain or redness around one or both eyes. Return for follow-up as scheduled. No

## 2022-05-05 ENCOUNTER — CLINIC PROCEDURE ONLY (OUTPATIENT)
Dept: URBAN - METROPOLITAN AREA CLINIC 33 | Facility: CLINIC | Age: 87
End: 2022-05-05

## 2022-05-05 DIAGNOSIS — H35.3231: ICD-10-CM

## 2022-05-05 DIAGNOSIS — H35.372: ICD-10-CM

## 2022-05-05 PROCEDURE — 92134 CPTRZ OPH DX IMG PST SGM RTA: CPT

## 2022-05-05 PROCEDURE — J277850 RANIBIZUMAB (LUCENTIS) BILATERAL

## 2022-05-05 PROCEDURE — 92250 FUNDUS PHOTOGRAPHY W/I&R: CPT

## 2022-05-05 PROCEDURE — 67028 INJECTION EYE DRUG: CPT

## 2022-05-05 ASSESSMENT — TONOMETRY
OD_IOP_MMHG: 18
OS_IOP_MMHG: 19

## 2022-06-09 ENCOUNTER — CLINIC PROCEDURE ONLY (OUTPATIENT)
Dept: URBAN - METROPOLITAN AREA CLINIC 33 | Facility: CLINIC | Age: 87
End: 2022-06-09

## 2022-06-09 DIAGNOSIS — H35.372: ICD-10-CM

## 2022-06-09 DIAGNOSIS — H35.3231: ICD-10-CM

## 2022-06-09 PROCEDURE — 67028 INJECTION EYE DRUG: CPT

## 2022-06-09 PROCEDURE — 92134 CPTRZ OPH DX IMG PST SGM RTA: CPT

## 2022-06-09 PROCEDURE — 92250 FUNDUS PHOTOGRAPHY W/I&R: CPT

## 2022-06-09 PROCEDURE — J277850 RANIBIZUMAB (LUCENTIS) BILATERAL

## 2022-06-09 ASSESSMENT — TONOMETRY
OS_IOP_MMHG: 14
OD_IOP_MMHG: 12

## 2022-07-21 ENCOUNTER — FOLLOW UP (OUTPATIENT)
Dept: URBAN - METROPOLITAN AREA CLINIC 33 | Facility: CLINIC | Age: 87
End: 2022-07-21

## 2022-07-21 VITALS — HEIGHT: 64 IN | WEIGHT: 170 LBS | BODY MASS INDEX: 29.02 KG/M2

## 2022-07-21 DIAGNOSIS — H43.813: ICD-10-CM

## 2022-07-21 DIAGNOSIS — H35.372: ICD-10-CM

## 2022-07-21 DIAGNOSIS — H35.3231: ICD-10-CM

## 2022-07-21 DIAGNOSIS — H35.3114: ICD-10-CM

## 2022-07-21 DIAGNOSIS — H35.3123: ICD-10-CM

## 2022-07-21 DIAGNOSIS — H04.123: ICD-10-CM

## 2022-07-21 PROCEDURE — 67028 INJECTION EYE DRUG: CPT

## 2022-07-21 PROCEDURE — 92014 COMPRE OPH EXAM EST PT 1/>: CPT

## 2022-07-21 PROCEDURE — J277850 RANIBIZUMAB (LUCENTIS) BILATERAL

## 2022-07-21 PROCEDURE — 92250 FUNDUS PHOTOGRAPHY W/I&R: CPT

## 2022-07-21 PROCEDURE — 92134 CPTRZ OPH DX IMG PST SGM RTA: CPT

## 2022-07-21 ASSESSMENT — VISUAL ACUITY
OS_SC: 20/200+2
OD_SC: 20/400-1

## 2022-07-21 ASSESSMENT — TONOMETRY
OD_IOP_MMHG: 13
OS_IOP_MMHG: 13

## 2022-09-01 ENCOUNTER — CLINIC PROCEDURE ONLY (OUTPATIENT)
Dept: URBAN - METROPOLITAN AREA CLINIC 33 | Facility: CLINIC | Age: 87
End: 2022-09-01

## 2022-09-01 DIAGNOSIS — H35.372: ICD-10-CM

## 2022-09-01 DIAGNOSIS — H35.3231: ICD-10-CM

## 2022-09-01 PROCEDURE — 92134 CPTRZ OPH DX IMG PST SGM RTA: CPT

## 2022-09-01 PROCEDURE — 92250 FUNDUS PHOTOGRAPHY W/I&R: CPT

## 2022-09-01 PROCEDURE — 6702850 BILATERAL INTRAVITREAL INJECTION

## 2022-09-01 PROCEDURE — J277850 RANIBIZUMAB (LUCENTIS) BILATERAL

## 2022-09-01 ASSESSMENT — TONOMETRY
OS_IOP_MMHG: 15
OD_IOP_MMHG: 15

## 2022-10-20 ENCOUNTER — CLINIC PROCEDURE ONLY (OUTPATIENT)
Dept: URBAN - METROPOLITAN AREA CLINIC 33 | Facility: CLINIC | Age: 87
End: 2022-10-20

## 2022-10-20 DIAGNOSIS — H35.372: ICD-10-CM

## 2022-10-20 DIAGNOSIS — H35.3231: ICD-10-CM

## 2022-10-20 PROCEDURE — J277850 RANIBIZUMAB (LUCENTIS) BILATERAL

## 2022-10-20 PROCEDURE — 6702850 BILATERAL INTRAVITREAL INJECTION

## 2022-10-20 PROCEDURE — 92250 FUNDUS PHOTOGRAPHY W/I&R: CPT

## 2022-10-20 PROCEDURE — 92134 CPTRZ OPH DX IMG PST SGM RTA: CPT

## 2022-10-20 ASSESSMENT — TONOMETRY
OS_IOP_MMHG: 15
OD_IOP_MMHG: 13

## 2022-12-01 ENCOUNTER — CLINIC PROCEDURE ONLY (OUTPATIENT)
Dept: URBAN - METROPOLITAN AREA CLINIC 33 | Facility: CLINIC | Age: 87
End: 2022-12-01

## 2022-12-01 PROCEDURE — 92134 CPTRZ OPH DX IMG PST SGM RTA: CPT

## 2022-12-01 PROCEDURE — J277850 RANIBIZUMAB (LUCENTIS) BILATERAL

## 2022-12-01 PROCEDURE — 6702850 BILATERAL INTRAVITREAL INJECTION

## 2022-12-01 PROCEDURE — 92250 FUNDUS PHOTOGRAPHY W/I&R: CPT

## 2022-12-01 ASSESSMENT — VISUAL ACUITY
OS_CC: 20/80+2
OD_CC: CF 3FT

## 2022-12-01 ASSESSMENT — TONOMETRY
OD_IOP_MMHG: 11
OS_IOP_MMHG: 12

## 2023-01-12 ENCOUNTER — COMPREHENSIVE EXAM (OUTPATIENT)
Dept: URBAN - METROPOLITAN AREA CLINIC 33 | Facility: CLINIC | Age: 88
End: 2023-01-12

## 2023-01-12 DIAGNOSIS — H35.3114: ICD-10-CM

## 2023-01-12 DIAGNOSIS — H35.372: ICD-10-CM

## 2023-01-12 DIAGNOSIS — H35.3123: ICD-10-CM

## 2023-01-12 DIAGNOSIS — H35.3231: ICD-10-CM

## 2023-01-12 DIAGNOSIS — H43.813: ICD-10-CM

## 2023-01-12 DIAGNOSIS — H04.123: ICD-10-CM

## 2023-01-12 DIAGNOSIS — H53.8: ICD-10-CM

## 2023-01-12 PROCEDURE — 92250 FUNDUS PHOTOGRAPHY W/I&R: CPT

## 2023-01-12 PROCEDURE — 6702850 BILATERAL INTRAVITREAL INJECTION

## 2023-01-12 PROCEDURE — J277850 RANIBIZUMAB (LUCENTIS) BILATERAL

## 2023-01-12 PROCEDURE — 92134 CPTRZ OPH DX IMG PST SGM RTA: CPT

## 2023-01-12 PROCEDURE — 92014 COMPRE OPH EXAM EST PT 1/>: CPT

## 2023-01-12 ASSESSMENT — VISUAL ACUITY
OS_CC: 20/100-2
OD_CC: CF 4FT

## 2023-01-12 ASSESSMENT — TONOMETRY
OD_IOP_MMHG: 14
OS_IOP_MMHG: 16

## 2023-04-13 ENCOUNTER — CLINIC PROCEDURE ONLY (OUTPATIENT)
Dept: URBAN - METROPOLITAN AREA CLINIC 33 | Facility: CLINIC | Age: 88
End: 2023-04-13

## 2023-04-13 DIAGNOSIS — H35.3231: ICD-10-CM

## 2023-04-13 PROCEDURE — 92134 CPTRZ OPH DX IMG PST SGM RTA: CPT

## 2023-04-13 PROCEDURE — 92250 FUNDUS PHOTOGRAPHY W/I&R: CPT

## 2023-04-13 PROCEDURE — 67028 INJECTION EYE DRUG: CPT

## 2023-04-13 ASSESSMENT — TONOMETRY
OD_IOP_MMHG: 11
OS_IOP_MMHG: 19

## 2023-04-27 ENCOUNTER — CLINIC PROCEDURE ONLY (OUTPATIENT)
Dept: URBAN - METROPOLITAN AREA CLINIC 33 | Facility: CLINIC | Age: 88
End: 2023-04-27

## 2023-04-27 DIAGNOSIS — H35.3123: ICD-10-CM

## 2023-04-27 DIAGNOSIS — H35.3231: ICD-10-CM

## 2023-04-27 PROCEDURE — 67028 INJECTION EYE DRUG: CPT

## 2023-04-27 PROCEDURE — J3490SYFS SYFOVRE SAMPLE

## 2023-04-27 PROCEDURE — 92250 FUNDUS PHOTOGRAPHY W/I&R: CPT

## 2023-04-27 ASSESSMENT — TONOMETRY: OS_IOP_MMHG: 13

## 2023-06-08 ENCOUNTER — CLINIC PROCEDURE ONLY (OUTPATIENT)
Dept: URBAN - METROPOLITAN AREA CLINIC 33 | Facility: CLINIC | Age: 88
End: 2023-06-08

## 2023-06-08 DIAGNOSIS — H35.3231: ICD-10-CM

## 2023-06-08 DIAGNOSIS — H35.372: ICD-10-CM

## 2023-06-08 PROCEDURE — J277850 RANIBIZUMAB (LUCENTIS) BILATERAL

## 2023-06-08 PROCEDURE — 92134 CPTRZ OPH DX IMG PST SGM RTA: CPT

## 2023-06-08 PROCEDURE — 6702850 BILATERAL INTRAVITREAL INJECTION

## 2023-06-08 ASSESSMENT — TONOMETRY
OD_IOP_MMHG: 10
OS_IOP_MMHG: 11

## 2023-06-15 ENCOUNTER — CLINIC PROCEDURE ONLY (OUTPATIENT)
Dept: URBAN - METROPOLITAN AREA CLINIC 33 | Facility: CLINIC | Age: 88
End: 2023-06-15

## 2023-06-15 DIAGNOSIS — H35.3231: ICD-10-CM

## 2023-06-15 DIAGNOSIS — H35.3123: ICD-10-CM

## 2023-06-15 DIAGNOSIS — H35.372: ICD-10-CM

## 2023-06-15 PROCEDURE — J3490SYFS SYFOVRE SAMPLE

## 2023-06-15 PROCEDURE — 67028 INJECTION EYE DRUG: CPT

## 2023-06-15 PROCEDURE — 92250 FUNDUS PHOTOGRAPHY W/I&R: CPT

## 2023-06-15 ASSESSMENT — TONOMETRY: OS_IOP_MMHG: 11

## 2023-08-03 ENCOUNTER — CLINIC PROCEDURE ONLY (OUTPATIENT)
Dept: URBAN - METROPOLITAN AREA CLINIC 33 | Facility: CLINIC | Age: 88
End: 2023-08-03

## 2023-08-03 DIAGNOSIS — H35.3231: ICD-10-CM

## 2023-08-03 DIAGNOSIS — H35.372: ICD-10-CM

## 2023-08-03 PROCEDURE — 6702850 BILATERAL INTRAVITREAL INJECTION

## 2023-08-03 PROCEDURE — J277850 RANIBIZUMAB (LUCENTIS) BILATERAL

## 2023-08-03 PROCEDURE — 92134 CPTRZ OPH DX IMG PST SGM RTA: CPT

## 2023-08-03 ASSESSMENT — TONOMETRY
OD_IOP_MMHG: 11
OS_IOP_MMHG: 10

## 2023-08-10 ENCOUNTER — CLINIC PROCEDURE ONLY (OUTPATIENT)
Dept: URBAN - METROPOLITAN AREA CLINIC 33 | Facility: CLINIC | Age: 88
End: 2023-08-10

## 2023-08-10 DIAGNOSIS — H35.3114: ICD-10-CM

## 2023-08-10 DIAGNOSIS — H35.3123: ICD-10-CM

## 2023-08-10 DIAGNOSIS — H35.372: ICD-10-CM

## 2023-08-10 DIAGNOSIS — H35.3231: ICD-10-CM

## 2023-08-10 PROCEDURE — 92250 FUNDUS PHOTOGRAPHY W/I&R: CPT

## 2023-08-10 ASSESSMENT — TONOMETRY: OS_IOP_MMHG: 13

## 2023-09-21 ENCOUNTER — CLINIC PROCEDURE ONLY (OUTPATIENT)
Dept: URBAN - METROPOLITAN AREA CLINIC 33 | Facility: CLINIC | Age: 88
End: 2023-09-21

## 2023-09-21 DIAGNOSIS — H35.3231: ICD-10-CM

## 2023-09-21 DIAGNOSIS — H35.3123: ICD-10-CM

## 2023-09-21 PROCEDURE — 92134 CPTRZ OPH DX IMG PST SGM RTA: CPT

## 2023-09-21 PROCEDURE — 92250 FUNDUS PHOTOGRAPHY W/I&R: CPT

## 2023-09-21 PROCEDURE — J3490IZ IZERVAY 2MG

## 2023-09-21 PROCEDURE — 67028 INJECTION EYE DRUG: CPT

## 2023-09-21 ASSESSMENT — TONOMETRY: OS_IOP_MMHG: 13

## 2023-10-05 ENCOUNTER — CLINIC PROCEDURE ONLY (OUTPATIENT)
Dept: URBAN - METROPOLITAN AREA CLINIC 33 | Facility: CLINIC | Age: 88
End: 2023-10-05

## 2023-10-05 DIAGNOSIS — H35.372: ICD-10-CM

## 2023-10-05 DIAGNOSIS — H35.3123: ICD-10-CM

## 2023-10-05 DIAGNOSIS — H35.3231: ICD-10-CM

## 2023-10-05 DIAGNOSIS — H35.3114: ICD-10-CM

## 2023-10-05 PROCEDURE — 92134 CPTRZ OPH DX IMG PST SGM RTA: CPT

## 2023-10-05 PROCEDURE — J277850 RANIBIZUMAB (LUCENTIS) BILATERAL: Mod: JZ

## 2023-10-05 PROCEDURE — 6702850 BILATERAL INTRAVITREAL INJECTION

## 2023-10-05 ASSESSMENT — TONOMETRY
OD_IOP_MMHG: 12
OS_IOP_MMHG: 14

## 2023-10-26 ENCOUNTER — CLINIC PROCEDURE ONLY (OUTPATIENT)
Dept: URBAN - METROPOLITAN AREA CLINIC 33 | Facility: CLINIC | Age: 88
End: 2023-10-26

## 2023-10-26 DIAGNOSIS — H35.3114: ICD-10-CM

## 2023-10-26 DIAGNOSIS — H35.3123: ICD-10-CM

## 2023-10-26 DIAGNOSIS — H35.372: ICD-10-CM

## 2023-10-26 DIAGNOSIS — H35.3231: ICD-10-CM

## 2023-10-26 PROCEDURE — 92250 FUNDUS PHOTOGRAPHY W/I&R: CPT

## 2023-10-26 PROCEDURE — 67028 INJECTION EYE DRUG: CPT

## 2023-10-26 PROCEDURE — J3490IZ IZERVAY 2MG: Mod: JZ

## 2023-10-26 ASSESSMENT — TONOMETRY: OS_IOP_MMHG: 12

## 2023-11-30 ENCOUNTER — FOLLOW UP (OUTPATIENT)
Dept: URBAN - METROPOLITAN AREA CLINIC 33 | Facility: CLINIC | Age: 88
End: 2023-11-30

## 2023-11-30 DIAGNOSIS — H35.3123: ICD-10-CM

## 2023-11-30 DIAGNOSIS — H35.372: ICD-10-CM

## 2023-11-30 DIAGNOSIS — H35.3114: ICD-10-CM

## 2023-11-30 DIAGNOSIS — H43.813: ICD-10-CM

## 2023-11-30 DIAGNOSIS — H04.123: ICD-10-CM

## 2023-11-30 DIAGNOSIS — H35.3231: ICD-10-CM

## 2023-11-30 PROCEDURE — 92134 CPTRZ OPH DX IMG PST SGM RTA: CPT

## 2023-11-30 PROCEDURE — 92250 FUNDUS PHOTOGRAPHY W/I&R: CPT

## 2023-11-30 PROCEDURE — 92014 COMPRE OPH EXAM EST PT 1/>: CPT

## 2023-11-30 PROCEDURE — 6702850 BILATERAL INTRAVITREAL INJECTION

## 2023-11-30 ASSESSMENT — TONOMETRY
OS_IOP_MMHG: 19
OD_IOP_MMHG: 17
OS_IOP_MMHG: 23

## 2023-11-30 ASSESSMENT — VISUAL ACUITY
OD_SC: 20/400-1
OS_PH: 20/50-2
OS_SC: 20/100-1

## 2023-12-07 ENCOUNTER — CLINIC PROCEDURE ONLY (OUTPATIENT)
Dept: URBAN - METROPOLITAN AREA CLINIC 33 | Facility: CLINIC | Age: 88
End: 2023-12-07

## 2023-12-07 DIAGNOSIS — H35.3123: ICD-10-CM

## 2023-12-07 PROCEDURE — J3490IZ IZERVAY 2MG

## 2023-12-07 PROCEDURE — 67028 INJECTION EYE DRUG: CPT

## 2023-12-07 ASSESSMENT — TONOMETRY: OS_IOP_MMHG: 14

## 2024-01-11 ENCOUNTER — CLINIC PROCEDURE ONLY (OUTPATIENT)
Dept: URBAN - METROPOLITAN AREA CLINIC 33 | Facility: CLINIC | Age: 89
End: 2024-01-11

## 2024-01-11 DIAGNOSIS — H35.3231: ICD-10-CM

## 2024-01-11 DIAGNOSIS — H35.3123: ICD-10-CM

## 2024-01-11 PROCEDURE — 92250 FUNDUS PHOTOGRAPHY W/I&R: CPT

## 2024-01-11 PROCEDURE — J3490IZ IZERVAY 2MG

## 2024-01-11 PROCEDURE — 67028 INJECTION EYE DRUG: CPT

## 2024-01-11 ASSESSMENT — TONOMETRY: OS_IOP_MMHG: 18

## 2024-02-15 ENCOUNTER — CLINIC PROCEDURE ONLY (OUTPATIENT)
Dept: URBAN - METROPOLITAN AREA CLINIC 33 | Facility: CLINIC | Age: 89
End: 2024-02-15

## 2024-02-15 DIAGNOSIS — H35.3231: ICD-10-CM

## 2024-02-15 PROCEDURE — 67028 INJECTION EYE DRUG: CPT

## 2024-02-15 PROCEDURE — 92134 CPTRZ OPH DX IMG PST SGM RTA: CPT

## 2024-02-15 ASSESSMENT — TONOMETRY
OS_IOP_MMHG: 17
OD_IOP_MMHG: 12

## 2024-03-07 ENCOUNTER — CLINIC PROCEDURE ONLY (OUTPATIENT)
Dept: URBAN - METROPOLITAN AREA CLINIC 33 | Facility: CLINIC | Age: 89
End: 2024-03-07

## 2024-03-07 DIAGNOSIS — H35.3231: ICD-10-CM

## 2024-03-07 DIAGNOSIS — H35.372: ICD-10-CM

## 2024-03-07 DIAGNOSIS — H35.3123: ICD-10-CM

## 2024-03-07 DIAGNOSIS — H35.3114: ICD-10-CM

## 2024-03-07 PROCEDURE — 67028 INJECTION EYE DRUG: CPT

## 2024-03-07 PROCEDURE — J3490IZ IZERVAY 2MG

## 2024-03-07 PROCEDURE — 92250 FUNDUS PHOTOGRAPHY W/I&R: CPT

## 2024-03-07 ASSESSMENT — TONOMETRY: OS_IOP_MMHG: 14

## 2024-03-20 ENCOUNTER — NEW PATIENT (OUTPATIENT)
Dept: URBAN - METROPOLITAN AREA CLINIC 34 | Facility: CLINIC | Age: 89
End: 2024-03-20

## 2024-03-20 DIAGNOSIS — H53.8: ICD-10-CM

## 2024-03-20 PROCEDURE — 92015 DETERMINE REFRACTIVE STATE: CPT

## 2024-03-20 ASSESSMENT — VISUAL ACUITY
OS_CC: 20/100
OD_CC: 20/100-1
OD_CC: CF 2FT
OS_CC: 20/80-2

## 2024-04-04 ENCOUNTER — CLINIC PROCEDURE ONLY (OUTPATIENT)
Dept: URBAN - METROPOLITAN AREA CLINIC 33 | Facility: CLINIC | Age: 89
End: 2024-04-04

## 2024-04-04 DIAGNOSIS — H35.3231: ICD-10-CM

## 2024-04-04 PROCEDURE — 92134 CPTRZ OPH DX IMG PST SGM RTA: CPT

## 2024-04-04 PROCEDURE — 67028 INJECTION EYE DRUG: CPT

## 2024-04-04 ASSESSMENT — TONOMETRY
OS_IOP_MMHG: 14
OD_IOP_MMHG: 14

## 2024-04-11 ENCOUNTER — CLINIC PROCEDURE ONLY (OUTPATIENT)
Dept: URBAN - METROPOLITAN AREA CLINIC 33 | Facility: CLINIC | Age: 89
End: 2024-04-11

## 2024-04-11 DIAGNOSIS — H35.3123: ICD-10-CM

## 2024-04-11 PROCEDURE — 67028 INJECTION EYE DRUG: CPT

## 2024-04-11 ASSESSMENT — TONOMETRY: OS_IOP_MMHG: 16

## 2024-05-09 ENCOUNTER — CLINIC PROCEDURE ONLY (OUTPATIENT)
Dept: URBAN - METROPOLITAN AREA CLINIC 33 | Facility: CLINIC | Age: 89
End: 2024-05-09

## 2024-05-09 DIAGNOSIS — H35.3123: ICD-10-CM

## 2024-05-09 DIAGNOSIS — H35.3231: ICD-10-CM

## 2024-05-09 PROCEDURE — 92250 FUNDUS PHOTOGRAPHY W/I&R: CPT | Mod: 59

## 2024-05-09 PROCEDURE — 67028 INJECTION EYE DRUG: CPT

## 2024-05-09 PROCEDURE — 92134 CPTRZ OPH DX IMG PST SGM RTA: CPT

## 2024-05-09 ASSESSMENT — TONOMETRY: OS_IOP_MMHG: 17

## 2024-05-31 ENCOUNTER — CLINIC PROCEDURE ONLY (OUTPATIENT)
Dept: URBAN - METROPOLITAN AREA CLINIC 26 | Facility: CLINIC | Age: 89
End: 2024-05-31

## 2024-05-31 DIAGNOSIS — H35.3231: ICD-10-CM

## 2024-05-31 PROCEDURE — 92134 CPTRZ OPH DX IMG PST SGM RTA: CPT

## 2024-05-31 PROCEDURE — 67028 INJECTION EYE DRUG: CPT

## 2024-05-31 ASSESSMENT — TONOMETRY
OS_IOP_MMHG: 15
OD_IOP_MMHG: 13

## 2024-06-07 ENCOUNTER — CLINIC PROCEDURE ONLY (OUTPATIENT)
Dept: URBAN - METROPOLITAN AREA CLINIC 26 | Facility: CLINIC | Age: 89
End: 2024-06-07

## 2024-06-07 DIAGNOSIS — H35.3123: ICD-10-CM

## 2024-06-07 DIAGNOSIS — H35.3231: ICD-10-CM

## 2024-06-07 PROCEDURE — 92250 FUNDUS PHOTOGRAPHY W/I&R: CPT

## 2024-06-07 PROCEDURE — 67028 INJECTION EYE DRUG: CPT

## 2024-06-07 ASSESSMENT — VISUAL ACUITY
OS_SC: 20/200-2
OS_PH: 20/100+2

## 2024-06-07 ASSESSMENT — TONOMETRY: OS_IOP_MMHG: 14

## 2024-06-28 ENCOUNTER — CLINIC PROCEDURE ONLY (OUTPATIENT)
Dept: URBAN - METROPOLITAN AREA CLINIC 26 | Facility: CLINIC | Age: 89
End: 2024-06-28

## 2024-06-28 DIAGNOSIS — H35.3231: ICD-10-CM

## 2024-06-28 PROCEDURE — 67028 INJECTION EYE DRUG: CPT

## 2024-06-28 PROCEDURE — 92134 CPTRZ OPH DX IMG PST SGM RTA: CPT

## 2024-06-28 ASSESSMENT — TONOMETRY
OS_IOP_MMHG: 14
OD_IOP_MMHG: 12

## 2024-06-28 ASSESSMENT — VISUAL ACUITY
OS_SC: 20/200
OD_SC: 20/400

## 2024-07-15 ENCOUNTER — FOLLOW UP (OUTPATIENT)
Dept: URBAN - METROPOLITAN AREA CLINIC 26 | Facility: CLINIC | Age: 89
End: 2024-07-15

## 2024-07-15 VITALS — DIASTOLIC BLOOD PRESSURE: 60 MMHG | SYSTOLIC BLOOD PRESSURE: 152 MMHG | HEIGHT: 55 IN | HEART RATE: 66 BPM

## 2024-07-15 DIAGNOSIS — H04.123: ICD-10-CM

## 2024-07-15 DIAGNOSIS — H43.813: ICD-10-CM

## 2024-07-15 DIAGNOSIS — H35.3231: ICD-10-CM

## 2024-07-15 DIAGNOSIS — H53.8: ICD-10-CM

## 2024-07-15 DIAGNOSIS — H35.3123: ICD-10-CM

## 2024-07-15 DIAGNOSIS — H02.836: ICD-10-CM

## 2024-07-15 DIAGNOSIS — H01.003: ICD-10-CM

## 2024-07-15 DIAGNOSIS — H35.3114: ICD-10-CM

## 2024-07-15 DIAGNOSIS — H02.833: ICD-10-CM

## 2024-07-15 DIAGNOSIS — H01.006: ICD-10-CM

## 2024-07-15 DIAGNOSIS — H35.372: ICD-10-CM

## 2024-07-15 PROCEDURE — 67028 INJECTION EYE DRUG: CPT

## 2024-07-15 PROCEDURE — 92014 COMPRE OPH EXAM EST PT 1/>: CPT

## 2024-07-15 PROCEDURE — 92250 FUNDUS PHOTOGRAPHY W/I&R: CPT

## 2024-07-15 ASSESSMENT — VISUAL ACUITY
OD_CC: 20/400
OS_CC: 20/200

## 2024-07-15 ASSESSMENT — TONOMETRY
OS_IOP_MMHG: 13
OD_IOP_MMHG: 12

## 2024-08-15 ENCOUNTER — CLINIC PROCEDURE ONLY (OUTPATIENT)
Dept: URBAN - METROPOLITAN AREA CLINIC 33 | Facility: CLINIC | Age: 89
End: 2024-08-15

## 2024-08-15 DIAGNOSIS — H35.3123: ICD-10-CM

## 2024-08-15 DIAGNOSIS — H35.3231: ICD-10-CM

## 2024-08-15 PROCEDURE — 67028 INJECTION EYE DRUG: CPT

## 2024-08-15 PROCEDURE — 92250 FUNDUS PHOTOGRAPHY W/I&R: CPT

## 2024-08-15 ASSESSMENT — TONOMETRY: OS_IOP_MMHG: 12

## 2024-09-12 ENCOUNTER — CLINIC PROCEDURE ONLY (OUTPATIENT)
Dept: URBAN - METROPOLITAN AREA CLINIC 33 | Facility: CLINIC | Age: 89
End: 2024-09-12

## 2024-09-12 DIAGNOSIS — H35.3231: ICD-10-CM

## 2024-09-12 PROCEDURE — 92134 CPTRZ OPH DX IMG PST SGM RTA: CPT

## 2024-09-12 PROCEDURE — 67028 INJECTION EYE DRUG: CPT

## 2024-10-03 ENCOUNTER — CLINIC PROCEDURE ONLY (OUTPATIENT)
Dept: URBAN - METROPOLITAN AREA CLINIC 33 | Facility: CLINIC | Age: 89
End: 2024-10-03

## 2024-10-03 DIAGNOSIS — H35.3231: ICD-10-CM

## 2024-10-03 PROCEDURE — J2782S IZERVAY SAMPLE

## 2024-10-03 PROCEDURE — 92250 FUNDUS PHOTOGRAPHY W/I&R: CPT

## 2024-10-03 PROCEDURE — 67028 INJECTION EYE DRUG: CPT

## 2024-12-05 ENCOUNTER — COMPREHENSIVE EXAM (OUTPATIENT)
Age: 89
End: 2024-12-05

## 2024-12-05 DIAGNOSIS — H43.813: ICD-10-CM

## 2024-12-05 DIAGNOSIS — H35.3114: ICD-10-CM

## 2024-12-05 DIAGNOSIS — H53.8: ICD-10-CM

## 2024-12-05 DIAGNOSIS — H02.836: ICD-10-CM

## 2024-12-05 DIAGNOSIS — H35.3231: ICD-10-CM

## 2024-12-05 DIAGNOSIS — H35.372: ICD-10-CM

## 2024-12-05 DIAGNOSIS — H35.3123: ICD-10-CM

## 2024-12-05 DIAGNOSIS — H04.123: ICD-10-CM

## 2024-12-05 DIAGNOSIS — H01.003: ICD-10-CM

## 2024-12-05 DIAGNOSIS — H01.006: ICD-10-CM

## 2024-12-05 DIAGNOSIS — H02.833: ICD-10-CM

## 2024-12-05 PROCEDURE — 92014 COMPRE OPH EXAM EST PT 1/>: CPT | Mod: 25

## 2024-12-05 PROCEDURE — 92250 FUNDUS PHOTOGRAPHY W/I&R: CPT | Mod: 59

## 2024-12-05 PROCEDURE — 92134 CPTRZ OPH DX IMG PST SGM RTA: CPT

## 2024-12-05 PROCEDURE — 67028 INJECTION EYE DRUG: CPT | Mod: 50,RT

## 2025-02-27 ENCOUNTER — CLINIC PROCEDURE ONLY (OUTPATIENT)
Age: OVER 89
End: 2025-02-27

## 2025-02-27 DIAGNOSIS — H35.3231: ICD-10-CM

## 2025-02-27 PROCEDURE — 67028 INJECTION EYE DRUG: CPT

## 2025-02-27 PROCEDURE — 92134 CPTRZ OPH DX IMG PST SGM RTA: CPT

## 2025-03-06 ENCOUNTER — CLINIC PROCEDURE ONLY (OUTPATIENT)
Age: OVER 89
End: 2025-03-06

## 2025-03-06 DIAGNOSIS — H35.3231: ICD-10-CM

## 2025-03-06 DIAGNOSIS — H35.3123: ICD-10-CM

## 2025-03-06 PROCEDURE — 92250 FUNDUS PHOTOGRAPHY W/I&R: CPT

## 2025-03-06 PROCEDURE — 67028 INJECTION EYE DRUG: CPT
